# Patient Record
Sex: FEMALE | Race: WHITE
[De-identification: names, ages, dates, MRNs, and addresses within clinical notes are randomized per-mention and may not be internally consistent; named-entity substitution may affect disease eponyms.]

---

## 2019-04-28 ENCOUNTER — HOSPITAL ENCOUNTER (EMERGENCY)
Dept: HOSPITAL 63 - ER | Age: 44
Discharge: HOME | End: 2019-04-28
Payer: OTHER GOVERNMENT

## 2019-04-28 VITALS — BODY MASS INDEX: 25.9 KG/M2 | HEIGHT: 67 IN | WEIGHT: 165 LBS

## 2019-04-28 VITALS — SYSTOLIC BLOOD PRESSURE: 132 MMHG | DIASTOLIC BLOOD PRESSURE: 74 MMHG

## 2019-04-28 DIAGNOSIS — R42: ICD-10-CM

## 2019-04-28 DIAGNOSIS — G43.109: Primary | ICD-10-CM

## 2019-04-28 LAB — U PREG PATIENT: NEGATIVE

## 2019-04-28 PROCEDURE — 99285 EMERGENCY DEPT VISIT HI MDM: CPT

## 2019-04-28 PROCEDURE — 70450 CT HEAD/BRAIN W/O DYE: CPT

## 2019-04-28 PROCEDURE — 96372 THER/PROPH/DIAG INJ SC/IM: CPT

## 2019-04-28 PROCEDURE — 81025 URINE PREGNANCY TEST: CPT

## 2019-04-28 NOTE — RAD
Head CT without contrast



History:right-sided headache for one day    



Technique: Noncontrast CT imaging was acquired of the head.  [facility] Compliance

Statement:  One or more of the following individualized dose reduction

techniques were utilized for this examination:

1. Automated exposure control

2. Adjustment of the mA and/or kV according to patient size

3. Use of iterative reconstruction technique



Comparison: None



Findings: 



The ventricles, sulci, and cisterns are within normal limits in size and

configuration. There is no significant mass-effect, midline shift, or abnormal

extra-axial fluid collection. There is no evidence of acute parenchymal or

extraaxial hemorrhage.  The visualized paranasal sinuses and mastoid air cells

are aerated. No significant osseous abnormality is identified. 



Impression:



There is no evidence of an acute intracranial abnormality.

## 2019-04-28 NOTE — PHYS DOC
Past History


Past Medical History:  No Pertinent History, Migraines


Past Surgical History:  No Surgical History


Smoking:  Non-smoker


Alcohol Use:  None


Drug Use:  None





Adult General


Chief Complaint


Chief Complaint:  HEADACHE





HPI


HPI





Patient is a 83-year-old female presents with right-sided headache. Last night 

had some visual flashing lights that improved with ibuprofen. Headache started 

in the right side of her head. No significant change with bright lights. No 

phonophobia. One episode of nausea and vomiting. Patient has a remote history of

migraines when she was pregnant with her son who is currently 15 years old. This

is not as bad as those previous headaches. Patient feels a little lightheaded 

and unsteady on her feet. The ibuprofen does seem to help but didn't fully 

eliminate the pain. Patient denies any fevers. Denies any head trauma. Denies 

this being worse headache of life.[]





Review of Systems


Review of Systems





Constitutional: Denies fever or chills []


Eyes: Denies change in visual acuity, redness, or eye pain []


HENT: Denies nasal congestion or sore throat []


Respiratory: Denies cough or shortness of breath []


Cardiovascular: No chest pain or palpitations[]


GI: Denies abdominal pain, nausea, vomiting, bloody stools or diarrhea []


: Denies dysuria or hematuria []


Musculoskeletal: Denies back pain or joint pain []


Integument: Denies rash or skin lesions []


Neurologic: Denies focal weakness or sensory changes []


Endocrine: Denies polyuria or polydipsia []





All other systems were reviewed and found to be within normal limits, except as 

documented in this note.





Allergies


Allergies





Allergies








Coded Allergies Type Severity Reaction Last Updated Verified


 


  No Known Drug Allergies    4/28/19 No











Physical Exam


Physical Exam





Constitutional: Well developed, well nourished, mild discomfort, non-toxic 

appearance. []


HENT: Normocephalic, atraumatic, bilateral external ears normal, oropharynx 

moist, no oral exudates, nose normal. []


Eyes: PERRLA, EOMI, conjunctiva normal, no discharge. [] 


Neck: Normal range of motion, no tenderness, supple, no stridor. [] 


Cardiovascular:Heart rate regular rhythm, no murmur []


Lungs & Thorax:  Bilateral breath sounds clear to auscultation []


Abdomen: Bowel sounds normal, soft, no tenderness, no masses, no pulsatile 

masses. [] 


Skin: Warm, dry, no erythema, no rash. [] 


Back: No tenderness, no CVA tenderness. [] 


Extremities: No tenderness, no cyanosis, no clubbing, ROM intact, no edema. [] 


Neurologic: Alert and oriented X 3, normal motor function, normal sensory f

unction, no focal deficits noted. Normal rapid repetitive and alternating 

movements. NIH SS of 0 []


Psychologic: Affect normal, judgement normal, mood normal. []





Current Patient Data


Vital Signs





                                   Vital Signs








  Date Time  Temp Pulse Resp B/P (MAP) Pulse Ox O2 Delivery O2 Flow Rate FiO2


 


4/28/19 11:08 97.6 66 20  100 Room Air  











EKG


EKG


[]





Radiology/Procedures


Radiology/Procedures


PROCEDURE: CT HEAD WO CONTRAST








Head CT without contrast





History:right-sided headache for one day    





Technique: Noncontrast CT imaging was acquired of the head.  [facility] Compliance


Statement:  One or more of the following individualized dose reduction


techniques were utilized for this examination:


1. Automated exposure control


2. Adjustment of the mA and/or kV according to patient size


3. Use of iterative reconstruction technique





Comparison: None





Findings: 





The ventricles, sulci, and cisterns are within normal limits in size and


configuration. There is no significant mass-effect, midline shift, or abnormal


extra-axial fluid collection. There is no evidence of acute parenchymal or


extraaxial hemorrhage.  The visualized paranasal sinuses and mastoid air cells


are aerated. No significant osseous abnormality is identified. 





Impression:





There is no evidence of an acute intracranial abnormality.[]





Course & Med Decision Making


Course & Med Decision Making


Pertinent Labs and Imaging studies reviewed. (See chart for details)





ED course patient arrived, was placed in bed, and tolerated exam well. She 

received medicine for pain which did significantly improve her discomfort. She 

was transported to and from radiology without any complications. After the 

return of the imaging findings, these were discussed with the patient who voiced

 understanding. All questions were answered. She was discharged in improved 

condition.





Medical decision making: Believe this to be her first migraine in 15 years. Do 

not see any evidence of meningitis, encephalitis, intracranial mass or bleed, 

nor an acute stroke syndrome.[]





Dragon Disclaimer


Dragon Disclaimer


This electronic medical record was generated, in whole or in part, using a voice

 recognition dictation system.





Departure


Departure:


Impression:  


   Primary Impression:  


   Migraine headache with aura


Disposition:  01 HOME, SELF-CARE


Condition:  GOOD


Referrals:  


ROSSY VARGAS MD (PCP)


Follow-up in 2 days


Patient Instructions:  Migraine Headache





Additional Instructions:  


Follow-up with your regular doctor in 2 days. At the onset of your next headache

 like this, try 1/4 teaspoon of geeta dissolved in apple juice or water. Return

 to the ER if worsening headache, fever of more than 101, or any other 

concerns.


Scripts


Metoclopramide Hcl (REGLAN) 10 Mg Tablet


10 MG PO QID for nausea and vomiting, #30 TAB


   Prov: KENNY CASTILLO DO         4/28/19





Problem Qualifiers








   Primary Impression:  


   Migraine headache with aura


   Status migrainosus presence:  without status migrainosus  Intractability:  

   not intractable  Qualified Codes:  G43.109 - Migraine with aura, not 

   intractable, without status migrainosus








KENNY CASTILLO DO          Apr 28, 2019 11:37